# Patient Record
Sex: MALE | Race: OTHER | NOT HISPANIC OR LATINO
[De-identification: names, ages, dates, MRNs, and addresses within clinical notes are randomized per-mention and may not be internally consistent; named-entity substitution may affect disease eponyms.]

---

## 2021-06-15 PROBLEM — Z00.00 ENCOUNTER FOR PREVENTIVE HEALTH EXAMINATION: Status: ACTIVE | Noted: 2021-06-15

## 2021-06-17 ENCOUNTER — APPOINTMENT (OUTPATIENT)
Dept: OTOLARYNGOLOGY | Facility: CLINIC | Age: 26
End: 2021-06-17
Payer: COMMERCIAL

## 2021-06-17 VITALS
OXYGEN SATURATION: 98 % | HEART RATE: 72 BPM | SYSTOLIC BLOOD PRESSURE: 129 MMHG | TEMPERATURE: 98.5 F | BODY MASS INDEX: 27.63 KG/M2 | WEIGHT: 193 LBS | DIASTOLIC BLOOD PRESSURE: 81 MMHG | HEIGHT: 70 IN

## 2021-06-17 DIAGNOSIS — Z78.9 OTHER SPECIFIED HEALTH STATUS: ICD-10-CM

## 2021-06-17 PROCEDURE — 99072 ADDL SUPL MATRL&STAF TM PHE: CPT

## 2021-06-17 PROCEDURE — 69210 REMOVE IMPACTED EAR WAX UNI: CPT

## 2021-06-17 PROCEDURE — 99202 OFFICE O/P NEW SF 15 MIN: CPT | Mod: 25

## 2021-06-17 RX ORDER — FLUOXETINE HYDROCHLORIDE 40 MG/1
40 CAPSULE ORAL
Refills: 0 | Status: ACTIVE | COMMUNITY

## 2021-06-17 NOTE — HISTORY OF PRESENT ILLNESS
[de-identified] : Hx of impactions and usually has his ears cleaned q 6-10 months; no cleaning now in sev. years d/t COVID. Ears feel clogged but he denies baseline hearing loss or tinnitus. Qtip use: no

## 2021-06-17 NOTE — PHYSICAL EXAM
[Binocular Microscopic Exam] : Binocular microscopic exam was performed [FreeTextEntry8] : cerumen impaction removed with suction [FreeTextEntry9] : cerumen impaction removed with suction [Normal] : no rashes

## 2021-07-27 ENCOUNTER — EMERGENCY (EMERGENCY)
Facility: HOSPITAL | Age: 26
LOS: 1 days | Discharge: HOME | End: 2021-07-27
Attending: EMERGENCY MEDICINE | Admitting: EMERGENCY MEDICINE
Payer: COMMERCIAL

## 2021-07-27 VITALS
SYSTOLIC BLOOD PRESSURE: 125 MMHG | RESPIRATION RATE: 18 BRPM | TEMPERATURE: 98 F | HEART RATE: 80 BPM | WEIGHT: 184.97 LBS | DIASTOLIC BLOOD PRESSURE: 84 MMHG | OXYGEN SATURATION: 98 %

## 2021-07-27 VITALS
RESPIRATION RATE: 17 BRPM | DIASTOLIC BLOOD PRESSURE: 78 MMHG | OXYGEN SATURATION: 99 % | TEMPERATURE: 98 F | HEART RATE: 71 BPM | SYSTOLIC BLOOD PRESSURE: 119 MMHG

## 2021-07-27 DIAGNOSIS — K62.89 OTHER SPECIFIED DISEASES OF ANUS AND RECTUM: ICD-10-CM

## 2021-07-27 PROCEDURE — 99283 EMERGENCY DEPT VISIT LOW MDM: CPT

## 2021-07-27 RX ORDER — DOCUSATE SODIUM 100 MG
1 CAPSULE ORAL
Qty: 20 | Refills: 0
Start: 2021-07-27 | End: 2021-08-05

## 2021-07-27 NOTE — ED ADULT NURSE NOTE - NSIMPLEMENTINTERV_GEN_ALL_ED
Implemented All Universal Safety Interventions:  [place] to call system. Call bell, personal items and telephone within reach. Instruct patient to call for assistance. Room bathroom lighting operational. Non-slip footwear when patient is off stretcher. Physically safe environment: no spills, clutter or unnecessary equipment. Stretcher in lowest position, wheels locked, appropriate side rails in place.

## 2021-07-27 NOTE — ED PROVIDER NOTE - PATIENT PORTAL LINK FT
You can access the FollowMyHealth Patient Portal offered by [facility] by registering at the following website: [email or web address]. By joining [facility]’s FollowMyHealth portal, you will also be able to view your health information using other applications (apps) compatible with our system.

## 2021-07-27 NOTE — ED PROVIDER NOTE - NSFOLLOWUPINSTRUCTIONS_ED_ALL_ED_FT
take medications as directed     increased water and fiber in diet    follow up with your PMD as scheduled today     return to ER if pain worsens or for any other concern

## 2021-07-27 NOTE — ED PROVIDER NOTE - GASTROINTESTINAL, MLM
Abdomen soft, non-tender, no guarding.  + skin tear 6' position of rectum mild surrounding erythema no focal swelling fluctuance or leasion

## 2021-07-27 NOTE — ED PROVIDER NOTE - OBJECTIVE STATEMENT
24 yo male presents with 5 days of non progressive sharp rectal pain worse to touch. pain started after a large hard BM approx 7 days ago. pt also has receiving sex with men - last protected anal intercourse 14 days ago.     no fever no chills. no rectal bleeding.   no rectal masses no prior history of rectal infection

## 2021-07-27 NOTE — ED PROVIDER NOTE - CONSTITUTIONAL, MLM
normal... Well appearing, awake, alert, oriented to person, place, time/situation and in no apparent distress. sitting calmly on stretcher

## 2021-07-27 NOTE — ED PROVIDER NOTE - CLINICAL SUMMARY MEDICAL DECISION MAKING FREE TEXT BOX
ED evaluation and management discussed with the patient and family (if available) in detail.  Close PMD follow up encouraged.  Strict ED return instructions discussed in detail and patient given the opportunity to ask any questions about their discharge diagnosis and instructions. Patient verbalized understanding.    rectal pain from skin tear

## 2021-11-08 ENCOUNTER — NON-APPOINTMENT (OUTPATIENT)
Age: 26
End: 2021-11-08

## 2021-11-08 PROBLEM — Z78.9 OTHER SPECIFIED HEALTH STATUS: Chronic | Status: ACTIVE | Noted: 2021-07-27

## 2021-11-09 ENCOUNTER — TRANSCRIPTION ENCOUNTER (OUTPATIENT)
Age: 26
End: 2021-11-09

## 2021-11-09 ENCOUNTER — APPOINTMENT (OUTPATIENT)
Dept: OTOLARYNGOLOGY | Facility: CLINIC | Age: 26
End: 2021-11-09
Payer: COMMERCIAL

## 2021-11-09 VITALS
TEMPERATURE: 97.6 F | HEART RATE: 89 BPM | HEIGHT: 70 IN | DIASTOLIC BLOOD PRESSURE: 79 MMHG | SYSTOLIC BLOOD PRESSURE: 127 MMHG | OXYGEN SATURATION: 98 %

## 2021-11-09 PROCEDURE — 69210 REMOVE IMPACTED EAR WAX UNI: CPT

## 2021-11-09 PROCEDURE — 99212 OFFICE O/P EST SF 10 MIN: CPT | Mod: 25

## 2021-11-09 NOTE — HISTORY OF PRESENT ILLNESS
[de-identified] : Hx of impactions and usually has his ears cleaned q 6-10 months; now for a cleaning. Ears feel clogged but he denies baseline hearing loss or tinnitus. Qtip use: no

## 2021-11-09 NOTE — PHYSICAL EXAM
[Binocular Microscopic Exam] : Binocular microscopic exam was performed [Normal] : the left middle ear was normal [FreeTextEntry8] : cerumen impaction removed with suction [FreeTextEntry9] : cerumen impaction removed with suction

## 2022-05-09 ENCOUNTER — APPOINTMENT (OUTPATIENT)
Dept: OTOLARYNGOLOGY | Facility: CLINIC | Age: 27
End: 2022-05-09
Payer: COMMERCIAL

## 2022-05-09 VITALS
HEIGHT: 70 IN | TEMPERATURE: 97.3 F | OXYGEN SATURATION: 99 % | DIASTOLIC BLOOD PRESSURE: 89 MMHG | BODY MASS INDEX: 24.34 KG/M2 | HEART RATE: 72 BPM | SYSTOLIC BLOOD PRESSURE: 126 MMHG | WEIGHT: 170 LBS

## 2022-05-09 VITALS — HEIGHT: 70 IN | BODY MASS INDEX: 24.34 KG/M2 | WEIGHT: 170 LBS

## 2022-05-09 PROCEDURE — 69210 REMOVE IMPACTED EAR WAX UNI: CPT

## 2022-05-09 PROCEDURE — 99214 OFFICE O/P EST MOD 30 MIN: CPT | Mod: 25

## 2022-05-09 NOTE — PHYSICAL EXAM
[Binocular Microscopic Exam] : Binocular microscopic exam was performed [Normal] : mucosa is normal [de-identified] : 3+ w/ exudates [FreeTextEntry8] : cerumen impaction removed with suction [FreeTextEntry9] : cerumen impaction removed with suction

## 2022-05-09 NOTE — HISTORY OF PRESENT ILLNESS
[de-identified] : Hx of impactions and usually has his ears cleaned q 6-10 months; now for a cleaning. Ears feel clogged but he denies baseline hearing loss or tinnitus. Qtip use: no\par Today he c/o tonsillitis that is frequently strep pos (every 3-4 months for many years); this flared up again in the last few days & he started taking some leftover amox/clav he had at home. Fever of 101.8 at home. 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 No bleeding diatheses.

## 2022-06-28 ENCOUNTER — APPOINTMENT (OUTPATIENT)
Dept: OTOLARYNGOLOGY | Facility: CLINIC | Age: 27
End: 2022-06-28

## 2022-06-30 ENCOUNTER — APPOINTMENT (OUTPATIENT)
Dept: OTOLARYNGOLOGY | Facility: CLINIC | Age: 27
End: 2022-06-30

## 2022-06-30 PROCEDURE — 99215 OFFICE O/P EST HI 40 MIN: CPT | Mod: 95

## 2022-06-30 RX ORDER — OXYCODONE AND ACETAMINOPHEN 5; 325 MG/1; MG/1
5-325 TABLET ORAL
Qty: 30 | Refills: 0 | Status: ACTIVE | COMMUNITY
Start: 2022-06-30 | End: 1900-01-01

## 2022-07-06 ENCOUNTER — APPOINTMENT (OUTPATIENT)
Age: 27
End: 2022-07-06

## 2022-07-13 ENCOUNTER — RESULT REVIEW (OUTPATIENT)
Age: 27
End: 2022-07-13

## 2022-07-13 ENCOUNTER — APPOINTMENT (OUTPATIENT)
Dept: OTOLARYNGOLOGY | Facility: CLINIC | Age: 27
End: 2022-07-13

## 2022-07-13 PROCEDURE — 42826 REMOVAL OF TONSILS: CPT

## 2022-07-13 NOTE — ASSESSMENT
[FreeTextEntry1] : Preop done for tonsillectomy. The risks and benefits were fully reviewed including but not limited to: postoperative hemorrhage; velopharyngeal insufficiency; swallowing or speaking problems; asymmetric appearing palate. The patient would like to proceed. An educational perioperative care handout was given.\par [facility] Reference #: [id number]

## 2022-07-13 NOTE — HISTORY OF PRESENT ILLNESS
[Home] : at home, [unfilled] , at the time of the visit. [Medical Office: ([facility])___] : at the medical office located in  [Verbal consent obtained from patient] : the patient, [unfilled] [de-identified] : He c/o tonsillitis that is frequently strep pos (every 3-4 months for many years). 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 No bleeding diatheses. Now preop tonsillectomy

## 2022-07-13 NOTE — PHYSICAL EXAM
[de-identified] : Exam limited d/t lack of in-person appearance\par Constitutional: alert & oriented\par Face/head: normocephalic & atraumatic\par Ears & nose: normal external appearance.\par Neck: no visible lymphadenopathy or masses. Normal apparent range of motion. \par Eyes: no nystagmus or scleral icterus\par Neuro: CN 2-12 grossly intact\par Skin: exposed head & neck skin unremarkable..\par Resp: no dyspnea or coughing\par Psych: normal affect and appropriate linear thinking\par

## 2023-03-22 NOTE — REASON FOR VISIT
What Type Of Note Output Would You Prefer (Optional)?: Standard Output Hpi Title: Evaluation of Skin Lesions Have Your Spot(S) Been Treated In The Past?: has not been treated [Subsequent Evaluation] : a subsequent evaluation for [FreeTextEntry2] : ear wax

## 2023-08-14 ENCOUNTER — APPOINTMENT (OUTPATIENT)
Dept: OTOLARYNGOLOGY | Facility: CLINIC | Age: 28
End: 2023-08-14
Payer: COMMERCIAL

## 2023-08-14 VITALS
SYSTOLIC BLOOD PRESSURE: 116 MMHG | DIASTOLIC BLOOD PRESSURE: 77 MMHG | OXYGEN SATURATION: 99 % | HEIGHT: 70 IN | TEMPERATURE: 98.4 F | WEIGHT: 197 LBS | BODY MASS INDEX: 28.2 KG/M2

## 2023-08-14 DIAGNOSIS — Z86.2 PERSONAL HISTORY OF DISEASES OF THE BLOOD AND BLOOD-FORMING ORGANS AND CERTAIN DISORDERS INVOLVING THE IMMUNE MECHANISM: ICD-10-CM

## 2023-08-14 DIAGNOSIS — J03.91 ACUTE RECURRENT TONSILLITIS, UNSPECIFIED: ICD-10-CM

## 2023-08-14 PROCEDURE — 99212 OFFICE O/P EST SF 10 MIN: CPT | Mod: 25

## 2023-08-14 RX ORDER — AMOXICILLIN 400 MG/5ML
400 FOR SUSPENSION ORAL
Qty: 2 | Refills: 0 | Status: DISCONTINUED | COMMUNITY
Start: 2022-06-30 | End: 2023-08-14

## 2023-08-14 RX ORDER — AMOXICILLIN AND CLAVULANATE POTASSIUM 875; 125 MG/1; MG/1
875-125 TABLET, COATED ORAL
Qty: 14 | Refills: 0 | Status: DISCONTINUED | COMMUNITY
Start: 2022-05-09 | End: 2023-08-14

## 2023-08-14 NOTE — HISTORY OF PRESENT ILLNESS
[de-identified] : Feels like his ears are blocked with wax; doesn't use qtips.  s/p tonsillectomy 7/23 with no further infections.

## 2023-08-14 NOTE — PHYSICAL EXAM
[Binocular Microscopic Exam] : Binocular microscopic exam was performed [FreeTextEntry8] : Large cerumen plug was removed w/ a hook, suction and loop [FreeTextEntry9] : Large cerumen plug was removed w/ a hook, suction and loop [Removed] : palatine tonsils previously removed [Normal] : no masses and lesions seen, face is symmetric

## 2024-06-06 ENCOUNTER — APPOINTMENT (OUTPATIENT)
Dept: OTOLARYNGOLOGY | Facility: CLINIC | Age: 29
End: 2024-06-06
Payer: COMMERCIAL

## 2024-06-06 VITALS
HEART RATE: 62 BPM | OXYGEN SATURATION: 98 % | SYSTOLIC BLOOD PRESSURE: 137 MMHG | HEIGHT: 70 IN | WEIGHT: 197 LBS | BODY MASS INDEX: 28.2 KG/M2 | TEMPERATURE: 98.4 F | DIASTOLIC BLOOD PRESSURE: 87 MMHG

## 2024-06-06 DIAGNOSIS — H61.23 IMPACTED CERUMEN, BILATERAL: ICD-10-CM

## 2024-06-06 PROCEDURE — 69210 REMOVE IMPACTED EAR WAX UNI: CPT

## 2024-06-06 NOTE — PHYSICAL EXAM
[Removed] : palatine tonsils previously removed [Binocular Microscopic Exam] : Binocular microscopic exam was performed [Normal] : the left middle ear was normal [FreeTextEntry8] : obstructing cerumen removed with suction [FreeTextEntry9] : deep cerumen impaction removed with suction after pretreatment w/ H2O2

## 2024-06-06 NOTE — HISTORY OF PRESENT ILLNESS
[de-identified] : Feels like his ears are blocked with wax; doesn't use qtips.  s/p tonsillectomy 7/23 with no further infections.

## 2024-08-29 ENCOUNTER — APPOINTMENT (OUTPATIENT)
Dept: OTOLARYNGOLOGY | Facility: CLINIC | Age: 29
End: 2024-08-29
Payer: COMMERCIAL

## 2024-08-29 VITALS
OXYGEN SATURATION: 98 % | BODY MASS INDEX: 29.35 KG/M2 | TEMPERATURE: 98 F | HEART RATE: 88 BPM | DIASTOLIC BLOOD PRESSURE: 73 MMHG | WEIGHT: 205 LBS | SYSTOLIC BLOOD PRESSURE: 115 MMHG | HEIGHT: 70 IN

## 2024-08-29 PROCEDURE — 99214 OFFICE O/P EST MOD 30 MIN: CPT | Mod: 25

## 2024-08-29 RX ORDER — OFLOXACIN OTIC 3 MG/ML
0.3 SOLUTION AURICULAR (OTIC)
Qty: 1 | Refills: 0 | Status: ACTIVE | COMMUNITY
Start: 2024-08-29 | End: 1900-01-01

## 2024-08-30 NOTE — ASSESSMENT
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

## 2024-08-30 NOTE — ASSESSMENT
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

## 2024-08-30 NOTE — PHYSICAL EXAM
[Removed] : palatine tonsils previously removed [Binocular Microscopic Exam] : Binocular microscopic exam was performed [Normal] : parotid gland, submandibular gland and thyroid glands are normal [FreeTextEntry8] : deep cerumen removed to reveal a pars flaccida perf filled with squamous debris [FreeTextEntry9] : obstructing cerumen removed with suction [de-identified] : as noted

## 2024-08-30 NOTE — PHYSICAL EXAM
[Removed] : palatine tonsils previously removed [Binocular Microscopic Exam] : Binocular microscopic exam was performed [Normal] : parotid gland, submandibular gland and thyroid glands are normal [FreeTextEntry8] : deep cerumen removed to reveal a pars flaccida perf filled with squamous debris [FreeTextEntry9] : obstructing cerumen removed with suction [de-identified] : as noted

## 2024-08-30 NOTE — HISTORY OF PRESENT ILLNESS
[de-identified] : Intermittent R ear pain that woke him up last night; this began ~4d ago. No known bruxism. His hearing is fine; no tinnitus but the ear feels "off". Lots of ear infections as a child Hx of a lot of impactions.

## 2024-08-30 NOTE — HISTORY OF PRESENT ILLNESS
[de-identified] : Intermittent R ear pain that woke him up last night; this began ~4d ago. No known bruxism. His hearing is fine; no tinnitus but the ear feels "off". Lots of ear infections as a child Hx of a lot of impactions.

## 2024-09-03 ENCOUNTER — APPOINTMENT (OUTPATIENT)
Dept: OTOLARYNGOLOGY | Facility: CLINIC | Age: 29
End: 2024-09-03
Payer: COMMERCIAL

## 2024-09-03 VITALS
WEIGHT: 205 LBS | OXYGEN SATURATION: 98 % | SYSTOLIC BLOOD PRESSURE: 120 MMHG | BODY MASS INDEX: 29.35 KG/M2 | TEMPERATURE: 98 F | DIASTOLIC BLOOD PRESSURE: 77 MMHG | HEIGHT: 70 IN | HEART RATE: 82 BPM

## 2024-09-03 DIAGNOSIS — H61.21 IMPACTED CERUMEN, RIGHT EAR: ICD-10-CM

## 2024-09-03 DIAGNOSIS — H71.01 CHOLESTEATOMA OF ATTIC, RIGHT EAR: ICD-10-CM

## 2024-09-03 DIAGNOSIS — H90.11 CONDUCTIVE HEARING LOSS, UNILATERAL, RIGHT EAR, WITH UNRESTRICTED HEARING ON THE CONTRALATERAL SIDE: ICD-10-CM

## 2024-09-03 PROCEDURE — 92557 COMPREHENSIVE HEARING TEST: CPT

## 2024-09-03 PROCEDURE — 99214 OFFICE O/P EST MOD 30 MIN: CPT | Mod: 25

## 2024-09-03 PROCEDURE — 92504 EAR MICROSCOPY EXAMINATION: CPT

## 2024-09-03 PROCEDURE — 92550 TYMPANOMETRY & REFLEX THRESH: CPT | Mod: 52

## 2024-09-04 ENCOUNTER — RESULT REVIEW (OUTPATIENT)
Age: 29
End: 2024-09-04

## 2024-09-05 PROBLEM — H61.21 IMPACTED CERUMEN OF RIGHT EAR: Status: ACTIVE | Noted: 2021-06-17

## 2024-09-05 PROBLEM — H90.11 CONDUCTIVE HEARING LOSS OF RIGHT EAR WITH UNRESTRICTED HEARING OF LEFT EAR: Status: ACTIVE | Noted: 2024-08-30

## 2024-09-05 NOTE — HISTORY OF PRESENT ILLNESS
[de-identified] : Follows up for a suspected R attic cholesteatoma; he's been using drops to soften impacted debris. He still denies hearing loss; no tinnitus but the ear feels "off". Lots of ear infections as a child Hx of a lot of impactions.

## 2024-09-05 NOTE — PHYSICAL EXAM
[Removed] : palatine tonsils previously removed [Binocular Microscopic Exam] : Binocular microscopic exam was performed [Normal] : the left middle ear was normal [FreeTextEntry8] : further deep cerumen removed to reveal a pars flaccida perf filled with squamous debris [de-identified] : as noted

## 2024-09-06 ENCOUNTER — APPOINTMENT (OUTPATIENT)
Dept: OTOLARYNGOLOGY | Facility: CLINIC | Age: 29
End: 2024-09-06

## 2024-09-13 ENCOUNTER — APPOINTMENT (OUTPATIENT)
Dept: CT IMAGING | Facility: CLINIC | Age: 29
End: 2024-09-13
Payer: COMMERCIAL

## 2024-09-13 ENCOUNTER — OUTPATIENT (OUTPATIENT)
Dept: OUTPATIENT SERVICES | Facility: HOSPITAL | Age: 29
LOS: 1 days | End: 2024-09-13

## 2024-09-13 PROCEDURE — 70480 CT ORBIT/EAR/FOSSA W/O DYE: CPT | Mod: 26

## 2024-09-16 ENCOUNTER — APPOINTMENT (OUTPATIENT)
Dept: OTOLARYNGOLOGY | Facility: CLINIC | Age: 29
End: 2024-09-16
Payer: COMMERCIAL

## 2024-09-16 VITALS — WEIGHT: 200 LBS | HEIGHT: 70 IN | BODY MASS INDEX: 28.63 KG/M2

## 2024-09-16 DIAGNOSIS — H61.21 IMPACTED CERUMEN, RIGHT EAR: ICD-10-CM

## 2024-09-16 DIAGNOSIS — H90.11 CONDUCTIVE HEARING LOSS, UNILATERAL, RIGHT EAR, WITH UNRESTRICTED HEARING ON THE CONTRALATERAL SIDE: ICD-10-CM

## 2024-09-16 DIAGNOSIS — H71.01 CHOLESTEATOMA OF ATTIC, RIGHT EAR: ICD-10-CM

## 2024-09-16 PROCEDURE — 99215 OFFICE O/P EST HI 40 MIN: CPT | Mod: 25

## 2024-09-16 PROCEDURE — 69210 REMOVE IMPACTED EAR WAX UNI: CPT

## 2024-09-16 NOTE — HISTORY OF PRESENT ILLNESS
[de-identified] : JESS GOMEZ has a history of right ear fullness with recurrent cerumen for years.  Noted hearing loss and pain, pressure in the right ear in August 2024. No otorrhea.  Rare adult ear infections. Childhood ear infections without surgery reported.

## 2024-09-16 NOTE — CONSULT LETTER
[Please see my note below.] : Please see my note below. [FreeTextEntry2] : Dear TRUONG CHAUDHARY  [FreeTextEntry1] : Thank you for allowing me to participate in the care of JESS GOMEZ . Please see the attached visit note.    [facility] Medical Director of [facility] Department of Otolaryngology [facility]

## 2024-09-16 NOTE — PHYSICAL EXAM
[FreeTextEntry1] : Microscopic ear exam with cerumen debridement:  Right ear: Obstructing purulence was debrided from the ear canal at the pars flaccida using suction.  Underlying keratin retention and pars flaccida defect identified.  The pars tensa appears within normal limits.   Left ear: The ear canal was patent and nonobstructed.  The tympanic membrane was intact and noninflamed. [Normal] : mucosa is normal [Midline] : trachea located in midline position

## 2024-09-16 NOTE — ASSESSMENT
[FreeTextEntry1] : Advanced cholesteatoma of the right epitympanum with relatively mild symptoms and subtle tympanic membrane change.  I have reviewed this with the patient in detail.  I have emphasized the dangerous nature of this disease as it expands the bony confines of the epitympanum and beyond.  We discussed the relatively minor hearing loss that is currently present as it does not adequately reflect the severity of this disease.  Surgical intervention is advised.  All risks limitations, complications and alternatives reviewed in detail.  Questions answered.  He understands that a staged approach may be necessary for hearing restoration.  We discussed realistic expectations with regards to hearing outcomes.  Surgical plan: Right tympanomastoidectomy, cartilage graft, ossicular chain reconstruction as indicated.  I have recommended twice weekly use of otic drops and water protection prior to surgery.  Time based billing: This encounter required more than 40 minutes of time excluding procedures.

## 2024-09-16 NOTE — DATA REVIEWED
[de-identified] : Recent audiometry reviewed [de-identified] : CT scan including images reviewed with the patient in detail.